# Patient Record
Sex: FEMALE
[De-identification: names, ages, dates, MRNs, and addresses within clinical notes are randomized per-mention and may not be internally consistent; named-entity substitution may affect disease eponyms.]

---

## 2020-08-14 PROBLEM — Z00.00 ENCOUNTER FOR PREVENTIVE HEALTH EXAMINATION: Status: ACTIVE | Noted: 2020-08-14

## 2021-04-13 ENCOUNTER — APPOINTMENT (OUTPATIENT)
Dept: DISASTER EMERGENCY | Facility: OTHER | Age: 61
End: 2021-04-13
Payer: COMMERCIAL

## 2021-04-13 PROCEDURE — 0012A: CPT

## 2021-10-28 DIAGNOSIS — Z78.9 OTHER SPECIFIED HEALTH STATUS: ICD-10-CM

## 2021-11-26 ENCOUNTER — APPOINTMENT (OUTPATIENT)
Dept: OBGYN | Facility: CLINIC | Age: 61
End: 2021-11-26
Payer: COMMERCIAL

## 2021-11-26 VITALS
HEIGHT: 64 IN | SYSTOLIC BLOOD PRESSURE: 138 MMHG | OXYGEN SATURATION: 97 % | DIASTOLIC BLOOD PRESSURE: 80 MMHG | HEART RATE: 77 BPM | BODY MASS INDEX: 21.34 KG/M2 | WEIGHT: 125 LBS | RESPIRATION RATE: 16 BRPM

## 2021-11-26 DIAGNOSIS — Z01.419 ENCOUNTER FOR GYNECOLOGICAL EXAMINATION (GENERAL) (ROUTINE) W/OUT ABNORMAL FINDINGS: ICD-10-CM

## 2021-11-26 DIAGNOSIS — R92.2 INCONCLUSIVE MAMMOGRAM: ICD-10-CM

## 2021-11-26 DIAGNOSIS — Z11.3 ENCOUNTER FOR SCREENING FOR INFECTIONS WITH A PREDOMINANTLY SEXUAL MODE OF TRANSMISSION: ICD-10-CM

## 2021-11-26 DIAGNOSIS — N95.2 POSTMENOPAUSAL ATROPHIC VAGINITIS: ICD-10-CM

## 2021-11-26 DIAGNOSIS — Z11.51 ENCOUNTER FOR SCREENING FOR HUMAN PAPILLOMAVIRUS (HPV): ICD-10-CM

## 2021-11-26 PROCEDURE — 36415 COLL VENOUS BLD VENIPUNCTURE: CPT

## 2021-11-26 PROCEDURE — 99386 PREV VISIT NEW AGE 40-64: CPT

## 2021-11-26 PROCEDURE — 99213 OFFICE O/P EST LOW 20 MIN: CPT | Mod: 25

## 2021-11-26 RX ORDER — ALENDRONATE SODIUM 70 MG/1
70 TABLET ORAL
Qty: 12 | Refills: 0 | Status: ACTIVE | COMMUNITY
Start: 2021-09-22

## 2021-11-26 NOTE — PHYSICAL EXAM
[Chaperone Present] : A chaperone was present in the examining room during all aspects of the physical examination [Appropriately responsive] : appropriately responsive [Alert] : alert [No Acute Distress] : no acute distress [No Lymphadenopathy] : no lymphadenopathy [Soft] : soft [Non-tender] : non-tender [Non-distended] : non-distended [No HSM] : No HSM [No Lesions] : no lesions [No Mass] : no mass [Oriented x3] : oriented x3 [Examination Of The Breasts] : a normal appearance [No Masses] : no breast masses were palpable [Labia Majora] : normal [Labia Minora] : normal [Normal] : normal [FreeTextEntry1] : Medical scribe present in exam room for H&P; remained behind curtain for patient comfort during exam. Pt agreeable to scribe being present during visit. [No Discharge] : no discharge [Vulvar Atrophy] : vulvar atrophy [Atrophy] : atrophy [Dry Mucosa] : dry mucosa [Pink Rugae] : pink rugae [No Bleeding] : There was no active vaginal bleeding [Tenderness] : nontender [Enlarged ___ wks] : not enlarged [Uterine Adnexae] : non-palpable [Declined] : Patient declined rectal exam

## 2021-11-26 NOTE — HISTORY OF PRESENT ILLNESS
[Patient reported colonoscopy was normal] : Patient reported colonoscopy was normal [LMP unknown] : LMP unknown [postmenopausal] : postmenopausal [N] : Patient does not use contraception [Y] : Patient is sexually active [Mammogramdate] : 2020 [TextBox_19] : DUE [BreastSonogramDate] : 2020 [TextBox_25] : DUE [PapSmeardate] : 2019 [BoneDensityDate] : 2017 [TextBox_37] : DUE [ColonoscopyDate] : 2016 [PGxTotal] : 1 [facility] [[facility] : [facility] [Difficulty with [place]] : difficulty with intercourse [Options Discussed] : options discussed [Vaginal Lubrication] : vaginal lubrication [TextBox_18] : rx vagifem [Yes] : Patient has concerns regarding sex [Currently Active] : currently active [Men] : men [Vaginal] : vaginal [No] : No [Patient refuses STI testing] : Patient refuses STI testing [FreeTextEntry1] : vaginal dyness/dyspareunia

## 2021-11-26 NOTE — COUNSELING
[Nutrition/ Exercise/ Weight Management] : nutrition, exercise, weight management [Vitamins/Supplements] : vitamins/supplements [Drugs/Alcohol] : drugs, alcohol [Breast Self Exam] : breast self exam [Bladder Hygiene] : bladder hygiene [Intimate Partner Violence] : intimate partner violence [Confidentiality] : confidentiality [STD (testing, results, tx)] : STD (testing, results, tx) [Lab Results] : lab results [Medication Management] : medication management [Other ___] : [unfilled]

## 2021-11-26 NOTE — REASON FOR VISIT
[Initial] : an initial consultation for [Menopausal Symptoms] : menopausal symptoms [Dyspareunia] : dyspareunia [Vulvar/Vaginal Complaint] : vulvar/vaginal complaint [STI Concerns] : STI Concerns [Other: _____] : [unfilled] [FreeTextEntry2] : annual has not been seen at this practice since 2012

## 2021-11-26 NOTE — END OF VISIT
[FreeTextEntry3] : I, Jamison Gabbie, acted solely as a scribe for Carolina Rossi NP, on 11/26/2021.\par \par All medical record entries made by the scribe were at my, Carolina Rossi NP, direction and personally dictated by me on 11/26/2021. I have reviewed the chart and agree that the record accurately reflects my personal performance of the history, physical exam, assessment and plan. I have also personally directed, reviewed, and agreed with the chart.

## 2021-11-26 NOTE — REVIEW OF SYSTEMS
[Negative] : Heme/Lymph [Patient Intake Form Reviewed] : Patient intake form was reviewed [FreeTextEntry8] : Vaginal dryness

## 2021-11-26 NOTE — PLAN
[FreeTextEntry1] : Routine GYN Exam\par -Discussed and reviewed importance of monthly BSE\par -Accepts STI testing, importance safe sexual practices discussed\par -Pap/HPV test collected and sent at todays visit\par -RX mammo/sono given to pt with locations and instructions\par -Osteoporosis discussed; Continue vitd/Calcium supplementation/Alendronate and WBE to maintain bone density; DEXA due now, managed by PCP\par -f/u PCP for recommended HCM, vaccinations and CA screening. \par \par Atrophic postmenopausal vaginitis\par -Rx Vagifem, discussed she needs to use regularly to see result, also recommended coconut oil for vaginal lubrication\par \par During this visit 20 minutes was spent face-to-face with greater than 50% of the time dedicated to counseling.\par

## 2021-11-27 LAB
HBV SURFACE AG SER QL: NONREACTIVE
HCV AB SER QL: NONREACTIVE
HCV S/CO RATIO: 0.17 S/CO
HIV1+2 AB SPEC QL IA.RAPID: NONREACTIVE
T PALLIDUM AB SER QL IA: NEGATIVE

## 2021-12-01 LAB
C TRACH RRNA SPEC QL NAA+PROBE: NOT DETECTED
HPV HIGH+LOW RISK DNA PNL CVX: NOT DETECTED
N GONORRHOEA RRNA SPEC QL NAA+PROBE: NOT DETECTED
SOURCE AMPLIFICATION: NORMAL

## 2021-12-03 LAB — CYTOLOGY CVX/VAG DOC THIN PREP: ABNORMAL

## 2024-05-20 DIAGNOSIS — Z78.9 OTHER SPECIFIED HEALTH STATUS: ICD-10-CM

## 2024-05-20 DIAGNOSIS — Z80.8 FAMILY HISTORY OF MALIGNANT NEOPLASM OF OTHER ORGANS OR SYSTEMS: ICD-10-CM

## 2024-05-20 PROBLEM — Z82.49 FAMILY HISTORY OF CARDIAC DISORDER: Status: ACTIVE | Noted: 2024-05-20

## 2024-05-20 RX ORDER — ESTRADIOL 10 UG/1
10 TABLET, FILM COATED VAGINAL AT BEDTIME
Qty: 1 | Refills: 2 | Status: DISCONTINUED | COMMUNITY
Start: 2021-11-26 | End: 2024-05-20

## 2024-05-22 ENCOUNTER — APPOINTMENT (OUTPATIENT)
Dept: SURGERY | Facility: CLINIC | Age: 64
End: 2024-05-22
Payer: COMMERCIAL

## 2024-05-22 VITALS
RESPIRATION RATE: 17 BRPM | HEART RATE: 69 BPM | HEIGHT: 64 IN | BODY MASS INDEX: 22.2 KG/M2 | DIASTOLIC BLOOD PRESSURE: 87 MMHG | TEMPERATURE: 98.7 F | WEIGHT: 130 LBS | OXYGEN SATURATION: 98 % | SYSTOLIC BLOOD PRESSURE: 145 MMHG

## 2024-05-22 DIAGNOSIS — Z12.11 ENCOUNTER FOR SCREENING FOR MALIGNANT NEOPLASM OF COLON: ICD-10-CM

## 2024-05-22 DIAGNOSIS — Z82.49 FAMILY HISTORY OF ISCHEMIC HEART DISEASE AND OTHER DISEASES OF THE CIRCULATORY SYSTEM: ICD-10-CM

## 2024-05-22 PROCEDURE — 99204 OFFICE O/P NEW MOD 45 MIN: CPT

## 2024-05-22 NOTE — ASSESSMENT
[FreeTextEntry1] : 62 yo female in need of a screening colonoscopy. I discussed details of colonoscopy, R/B/A, all questions answered. The pt wishes to proceed and will schedule with my office.

## 2024-05-22 NOTE — PHYSICAL EXAM
[FreeTextEntry1] : This is a 63 year-old well-developed female in no apparent distress.  HEENT normocephalic, anicteric, external ears normal bilaterally, EOMs intact.  Cardiac - regular rate and rhythm.  Abdomen soft, nontender, nondistended, no masses. No hepatosplenomegaly.  No inguinal lymphadenopathy bilaterally.  Neuro-cranial nerves grossly intact. Normal gait.  Psychiatric-oriented to time place and person. Good understanding of conversation.

## 2024-05-22 NOTE — HISTORY OF PRESENT ILLNESS
[FreeTextEntry1] : Angelica is a 64 y/o female here for a consultation for colonoscopy.    She does not recall who did her previous colonoscopy more than 10 yrs ago reports that was normal.   Today patient reports feels well. Typically, she has 1 solid BM every day sometimes every other day. Denies rectal bleeding, swelling or abdominal pain. Not on anticoagulants. Denie unintentional weight loss   Denies family hx of colon CA, Crohn's or colitis.

## 2024-06-06 RX ORDER — SODIUM PICOSULFATE, MAGNESIUM OXIDE, AND ANHYDROUS CITRIC ACID 12; 3.5; 1 G/175ML; G/175ML; MG/175ML
10-3.5-12 MG-GM LIQUID ORAL
Qty: 1 | Refills: 0 | Status: ACTIVE | COMMUNITY
Start: 2024-06-06 | End: 1900-01-01

## 2024-08-20 ENCOUNTER — APPOINTMENT (OUTPATIENT)
Dept: SURGERY | Facility: HOSPITAL | Age: 64
End: 2024-08-20

## 2024-10-15 ENCOUNTER — APPOINTMENT (OUTPATIENT)
Dept: SURGERY | Facility: CLINIC | Age: 64
End: 2024-10-15

## 2024-10-15 DIAGNOSIS — R93.3 ABNORMAL FINDINGS ON DIAGNOSTIC IMAGING OF OTHER PARTS OF DIGESTIVE TRACT: ICD-10-CM

## 2024-10-15 PROCEDURE — 45330 DIAGNOSTIC SIGMOIDOSCOPY: CPT

## 2024-11-20 ENCOUNTER — OUTPATIENT (OUTPATIENT)
Dept: OUTPATIENT SERVICES | Facility: HOSPITAL | Age: 64
LOS: 1 days | End: 2024-11-20
Payer: COMMERCIAL

## 2024-11-20 ENCOUNTER — APPOINTMENT (OUTPATIENT)
Dept: CT IMAGING | Facility: CLINIC | Age: 64
End: 2024-11-20

## 2024-11-20 DIAGNOSIS — R93.3 ABNORMAL FINDINGS ON DIAGNOSTIC IMAGING OF OTHER PARTS OF DIGESTIVE TRACT: ICD-10-CM

## 2024-11-20 PROCEDURE — 74261 CT COLONOGRAPHY DX: CPT

## 2024-11-20 PROCEDURE — 74261 CT COLONOGRAPHY DX: CPT | Mod: 26
